# Patient Record
Sex: FEMALE | Race: ASIAN
[De-identification: names, ages, dates, MRNs, and addresses within clinical notes are randomized per-mention and may not be internally consistent; named-entity substitution may affect disease eponyms.]

---

## 2019-04-23 ENCOUNTER — HOSPITAL ENCOUNTER (OUTPATIENT)
Dept: HOSPITAL 10 - GIL | Age: 69
Discharge: HOME | End: 2019-04-23
Attending: INTERNAL MEDICINE
Payer: COMMERCIAL

## 2019-04-23 ENCOUNTER — HOSPITAL ENCOUNTER (OUTPATIENT)
Dept: HOSPITAL 91 - GIL | Age: 69
Discharge: HOME | End: 2019-04-23
Payer: MEDICARE

## 2019-04-23 VITALS
HEIGHT: 62 IN | WEIGHT: 173.72 LBS | WEIGHT: 173.72 LBS | BODY MASS INDEX: 31.97 KG/M2 | HEIGHT: 62 IN | BODY MASS INDEX: 31.97 KG/M2

## 2019-04-23 VITALS — DIASTOLIC BLOOD PRESSURE: 62 MMHG | RESPIRATION RATE: 20 BRPM | HEART RATE: 66 BPM | SYSTOLIC BLOOD PRESSURE: 123 MMHG

## 2019-04-23 VITALS — SYSTOLIC BLOOD PRESSURE: 119 MMHG | RESPIRATION RATE: 18 BRPM | DIASTOLIC BLOOD PRESSURE: 73 MMHG

## 2019-04-23 DIAGNOSIS — K44.9: Primary | ICD-10-CM

## 2019-04-23 DIAGNOSIS — I10: ICD-10-CM

## 2019-04-23 DIAGNOSIS — K21.9: ICD-10-CM

## 2019-04-23 PROCEDURE — 43239 EGD BIOPSY SINGLE/MULTIPLE: CPT

## 2019-04-23 PROCEDURE — 88305 TISSUE EXAM BY PATHOLOGIST: CPT

## 2019-04-23 PROCEDURE — 88312 SPECIAL STAINS GROUP 1: CPT

## 2019-04-23 NOTE — PAC
Date/Time of Note


Date/Time of Note


DATE: 4/23/19 


TIME: 11:48





Post-Anesthesia Notes


Post-Anesthesia Note


Last documented vital signs





Vital Signs


  Date      Temp  Pulse  Resp  B/P (MAP)  Pulse Ox  O2          O2 Flow     FiO2


Time                                                Delivery    Rate


   4/23/19    98     90    20     135/62        96  Non


      1148                                          Rebreather





Activity:  WNL


Respiratory function:  WNL


Cardiovascular function:  WNL


Mental status:  Baseline


Pain reasonably controlled:  Yes


Hydration appropriate:  Yes


Nausea/Vomiting absent:  Yes











JAYDEN QUIÑONES DO             Apr 23, 2019 11:49

## 2019-04-23 NOTE — PREAC
Date/Time of Note


Date/Time of Note


DATE: 4/23/19 


TIME: 11:03





Anesthesia Eval and Record


Evaluation


Time Pre-Procedure Interview


DATE: 4/23/19 


TIME: 11:03


Age


68


Sex


female


NPO:  8 hrs


Preoperative diagnosis


abd pain reflux


Planned procedure


egd colonocscopy





Past Medical History


Past Medical History:  Includes


Cardio:  HTN, Dyslipidemia


Pulm:  Smoking Hx


GI:  Obesity





Surgery & Anesthesia Issues


No known issue





Meds


Anticoagulation:  No


Beta Blocker within 24 hr:  No


Reason Beta Blocker not given:  Pt. not on B-Blocker


Reported Medications


Allopurinol* (Allopurinol*) 300 Mg Tablet, 300 MG PO DAILY, TAB


   4/23/19


Atorvastatin Calcium (Atorvastatin Calcium) 10 Mg Tablet, 10 MG PO QHS, #30 TAB


   4/23/19


Ranitidine Hcl* (Zantac*) 150 Mg Tablet, 150 MG PO HS, #30 TAB


   4/23/19


Losartan Potassium* (Losartan Potassium*) 100 Mg Tablet, 100 MG PO DAILY, TAB


   4/23/19


Meds reviewed:  Yes





Allergies


Coded Allergies:  


     No Known Allergy (Unverified , 4/23/19)


Allergies Reviewed:  Yes





Labs/Studies


Labs Reviewed:  Reviewed by anesthesiologist


Pregnancy test:  N/A





Pre-procedure Exam


Last vitals





Vital Signs


  Date      Temp  Pulse  Resp  B/P (MAP)   Pulse Ox  O2          O2 Flow    FiO2


Time                                                 Delivery    Rate


   4/23/19  97.6     66    20      123/62        96  Non


     10:53                           (82)            Rebreather





Airway:  Adequate mouth opening, Adequate thyromental dist


Mallampati:  Mallampati IV


Teeth:  Normal


Lung:  Normal


Heart:  Normal





ASA Physical Status


ASA physical status:  2


Emergency:  None





Pre-operative Attestations


Prior to commencing anesthesia and surgery, the patient was re-evaluated, there 


was verification of:


*The patient's identity


*The results of appropriate recent lab work and preoperative vital signs


*The above evaluation not changing prior to induction


*Anesthetic plan, risk benefits, alternative and complications discussed with 


patient/family; questions answered; patient/family understands, accepts and 


wishes to proceed.











JAYDEN QUIÑONES DO             Apr 23, 2019 11:04

## 2019-04-24 NOTE — CONS
DATE OF ADMISSION: 04/23/2019

DATE OF CONSULTATION:  

 

 

 

PATIENT NAME:  RACHEL MULLER

 

TYPE OF CONSULTATION:  Preoperative gastroenterology.

 

Dear Dr. Aranda:

 

I thank you very much for this kind referral.

 

HISTORY OF PRESENT ILLNESS:  Ms. Rachel Muller is a 68-year-old female patient who has been referred 
to me for further evaluation of upper abdominal pain and chronic heartburn, not responding to therapy
 with Zantac.  No past history of peptic ulcer disease.  Not on nonsteroidal anti-inflammatory agents
.  Appetite has been good and no weight loss.  No history of gallstones or liver disease.  The patien
t denies any change in the bowel habit or rectal bleeding.  No past history of colon neoplasm.  The p
atient never had screening colonoscopy.  She is hypertensive.  Not a diabetic.  No heart disease, jas
g problem or kidney disease.  The patient has got hyperlipidemia.  She also has history of gout.

 

SOCIAL HISTORY:  She is a smoker.  Denies alcohol abuse.

 

FAMILY HISTORY:  No family history of gastrointestinal tract neoplasm.

 

ALLERGIES:  NO DRUG ALLERGIES.

 

MEDICATIONS:

1.  Losartan 100 mg p.o. daily.

2.  Zantac 150 mg p.o. b.i.d.

3.  Atorvastatin 10 mg p.o. daily.

4.  Allopurinol 300 mg p.o. daily.

 

PHYSICAL EXAMINATION:

VITAL SIGNS:  She is 5 feet 2 inches tall and weighs 170 pounds.  BMI 31, blood pressure 128/82.

HEART:  Normal heart sounds.

LUNGS:  Clear.

ABDOMEN:  Soft, no masses.  Normal bowel sounds.

NEUROLOGIC:  Normal neurological exam.

 

IMPRESSION:

1.  Upper abdominal pain and chronic heartburn, not responding to therapy with Zantac.

2.  The patient is a 68-year-old and she never had screening colonoscopy.

3.  Hypertension.

4.  Hyperlipidemia.

5.  History of gout.

6.  Elevated body mass index.

7.  The patient is a smoker.

 

PLAN:

1.  The patient was strongly advised to stop smoking.

2.  Follow up with the primary MD for the management of elevated BMI and hypertension.

3.  Endoscopic examination for further evaluation of upper abdominal pain and chronic heartburn, not 
responding to therapy.

4.  The patient was advised also to undergo screening colonoscopy, but the patient does not want to h
ave a colonoscopy examination at the present time.

 

The procedure and possible complications are well explained to the patient.  She understands and cons
ents to the procedure.

 

I thank you once again.

 

With warmest personal regards,

 

 

Dictated By: MALINA MOSCOSO/BARRY

DD:    04/08/2019 17:39:35

DT:    04/08/2019 19:17:32

Conf#: [id number]

DID#:  [id number]

CC: MALINA SORIANO MD;*EndCC*